# Patient Record
Sex: MALE | Race: BLACK OR AFRICAN AMERICAN | NOT HISPANIC OR LATINO | Employment: OTHER | URBAN - METROPOLITAN AREA
[De-identification: names, ages, dates, MRNs, and addresses within clinical notes are randomized per-mention and may not be internally consistent; named-entity substitution may affect disease eponyms.]

---

## 2018-05-17 ENCOUNTER — HOSPITAL ENCOUNTER (EMERGENCY)
Facility: OTHER | Age: 23
Discharge: HOME | End: 2018-05-17
Attending: EMERGENCY MEDICINE
Payer: MEDICAID

## 2018-05-17 VITALS
OXYGEN SATURATION: 98 % | HEIGHT: 71 IN | HEART RATE: 57 BPM | BODY MASS INDEX: 23.8 KG/M2 | WEIGHT: 170 LBS | TEMPERATURE: 98 F | SYSTOLIC BLOOD PRESSURE: 120 MMHG | DIASTOLIC BLOOD PRESSURE: 78 MMHG | RESPIRATION RATE: 17 BRPM

## 2018-05-17 DIAGNOSIS — Z20.2 EXPOSURE TO STD: Primary | ICD-10-CM

## 2018-05-17 PROCEDURE — 63600175 PHARM REV CODE 636 W HCPCS: Mod: JG | Performed by: EMERGENCY MEDICINE

## 2018-05-17 PROCEDURE — 96372 THER/PROPH/DIAG INJ SC/IM: CPT

## 2018-05-17 PROCEDURE — 87491 CHLMYD TRACH DNA AMP PROBE: CPT

## 2018-05-17 PROCEDURE — 86592 SYPHILIS TEST NON-TREP QUAL: CPT

## 2018-05-17 PROCEDURE — 99283 EMERGENCY DEPT VISIT LOW MDM: CPT | Mod: 25

## 2018-05-17 RX ADMIN — PENICILLIN G BENZATHINE 2.4 MILLION UNITS: 1200000 INJECTION, SUSPENSION INTRAMUSCULAR at 11:05

## 2018-05-17 NOTE — ED NOTES
Patient Identifiers for Get Owens checked and correct  LOC: The patient is awake, alert and aware of environment with an appropriate affect, the patient is oriented x 3 and speaking appropriate.  APPEARANCE: Patient resting comfortably and in no acute distress. The patient is clean and well groomed. The patient's clothing is properly fastened.  SKIN: The skin is warm and dry. The patient has normal skin turgor and moist mucus membranes. No rashes or lesions upon observation. Skin Intact , no breakdown noted.  Musculoskeletal :  Normal range of motion noted. Moves all extremities well, No swelling or tenderness noted  RESPIRATORY: Airway is open and patent, respirations are spontaneous, patient has a normal effort and rate. Breath sounds are clear & equal, bilaterally.  CARDIAC: Patient has a normal rate and rhythm, no peripheral edema noted, capillary refill < 3 seconds.   ABDOMEN: Soft and non tender to palpation, no distention observed.   PULSES: 2+ radial & pedal pulses, symmetrical in all extremities.  NEUROLOGIC: PERRL, Pupils 3mm and reacts briskly to light. Motor strength 5/5 all extremities.  The pt's facial expression is symmetrical, patient moving all extremities, normal sensation in all extremities when touched with a finger.The patient is awake, alert and cooperative with a calm affect, patient is aware of environment.      Will continue to monitor

## 2018-05-18 LAB
C TRACH DNA SPEC QL NAA+PROBE: NOT DETECTED
N GONORRHOEA DNA SPEC QL NAA+PROBE: NOT DETECTED
RPR SER QL: NORMAL

## 2018-06-20 ENCOUNTER — HOSPITAL ENCOUNTER (EMERGENCY)
Facility: OTHER | Age: 23
Discharge: HOME | End: 2018-06-21
Attending: EMERGENCY MEDICINE
Payer: MEDICAID

## 2018-06-20 VITALS
DIASTOLIC BLOOD PRESSURE: 75 MMHG | RESPIRATION RATE: 18 BRPM | BODY MASS INDEX: 21.7 KG/M2 | SYSTOLIC BLOOD PRESSURE: 128 MMHG | TEMPERATURE: 98 F | OXYGEN SATURATION: 99 % | HEART RATE: 122 BPM | HEIGHT: 71 IN | WEIGHT: 155 LBS

## 2018-06-20 DIAGNOSIS — Z87.81 HISTORY OF HAND FRACTURE: Primary | ICD-10-CM

## 2018-06-20 PROCEDURE — 99283 EMERGENCY DEPT VISIT LOW MDM: CPT
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
## 2020-07-16 ENCOUNTER — LAB VISIT (OUTPATIENT)
Dept: LAB | Facility: OTHER | Age: 25
End: 2020-07-16
Payer: OTHER GOVERNMENT

## 2020-07-16 DIAGNOSIS — Z20.822 SUSPECTED COVID-19 VIRUS INFECTION: ICD-10-CM

## 2020-07-16 DIAGNOSIS — Z03.818 ENCOUNTER FOR OBSERVATION FOR SUSPECTED EXPOSURE TO OTHER BIOLOGICAL AGENTS RULED OUT: ICD-10-CM

## 2020-07-16 PROCEDURE — U0003 INFECTIOUS AGENT DETECTION BY NUCLEIC ACID (DNA OR RNA); SEVERE ACUTE RESPIRATORY SYNDROME CORONAVIRUS 2 (SARS-COV-2) (CORONAVIRUS DISEASE [COVID-19]), AMPLIFIED PROBE TECHNIQUE, MAKING USE OF HIGH THROUGHPUT TECHNOLOGIES AS DESCRIBED BY CMS-2020-01-R: HCPCS

## 2020-07-20 LAB — SARS-COV-2 RNA RESP QL NAA+PROBE: NEGATIVE

## 2020-08-12 ENCOUNTER — LAB VISIT (OUTPATIENT)
Dept: LAB | Facility: OTHER | Age: 25
End: 2020-08-12
Payer: OTHER GOVERNMENT

## 2020-08-12 DIAGNOSIS — Z03.818 ENCOUNTER FOR OBSERVATION FOR SUSPECTED EXPOSURE TO OTHER BIOLOGICAL AGENTS RULED OUT: ICD-10-CM

## 2020-08-12 PROCEDURE — U0003 INFECTIOUS AGENT DETECTION BY NUCLEIC ACID (DNA OR RNA); SEVERE ACUTE RESPIRATORY SYNDROME CORONAVIRUS 2 (SARS-COV-2) (CORONAVIRUS DISEASE [COVID-19]), AMPLIFIED PROBE TECHNIQUE, MAKING USE OF HIGH THROUGHPUT TECHNOLOGIES AS DESCRIBED BY CMS-2020-01-R: HCPCS

## 2020-08-14 LAB — SARS-COV-2 RNA RESP QL NAA+PROBE: NOT DETECTED

## 2020-09-09 ENCOUNTER — LAB VISIT (OUTPATIENT)
Dept: PRIMARY CARE CLINIC | Facility: OTHER | Age: 25
End: 2020-09-09
Payer: OTHER GOVERNMENT

## 2020-09-09 DIAGNOSIS — Z03.818 ENCOUNTER FOR OBSERVATION FOR SUSPECTED EXPOSURE TO OTHER BIOLOGICAL AGENTS RULED OUT: ICD-10-CM

## 2020-09-09 PROCEDURE — U0003 INFECTIOUS AGENT DETECTION BY NUCLEIC ACID (DNA OR RNA); SEVERE ACUTE RESPIRATORY SYNDROME CORONAVIRUS 2 (SARS-COV-2) (CORONAVIRUS DISEASE [COVID-19]), AMPLIFIED PROBE TECHNIQUE, MAKING USE OF HIGH THROUGHPUT TECHNOLOGIES AS DESCRIBED BY CMS-2020-01-R: HCPCS

## 2020-09-10 LAB — SARS-COV-2 RNA RESP QL NAA+PROBE: NOT DETECTED

## 2020-09-29 ENCOUNTER — LAB VISIT (OUTPATIENT)
Dept: PRIMARY CARE CLINIC | Facility: OTHER | Age: 25
End: 2020-09-29
Payer: OTHER GOVERNMENT

## 2020-09-29 DIAGNOSIS — Z03.818 ENCOUNTER FOR OBSERVATION FOR SUSPECTED EXPOSURE TO OTHER BIOLOGICAL AGENTS RULED OUT: ICD-10-CM

## 2020-09-29 PROCEDURE — U0003 INFECTIOUS AGENT DETECTION BY NUCLEIC ACID (DNA OR RNA); SEVERE ACUTE RESPIRATORY SYNDROME CORONAVIRUS 2 (SARS-COV-2) (CORONAVIRUS DISEASE [COVID-19]), AMPLIFIED PROBE TECHNIQUE, MAKING USE OF HIGH THROUGHPUT TECHNOLOGIES AS DESCRIBED BY CMS-2020-01-R: HCPCS

## 2020-10-01 LAB — SARS-COV-2 RNA RESP QL NAA+PROBE: NOT DETECTED

## 2020-10-08 ENCOUNTER — LAB VISIT (OUTPATIENT)
Dept: PRIMARY CARE CLINIC | Facility: OTHER | Age: 25
End: 2020-10-08
Attending: INTERNAL MEDICINE
Payer: OTHER GOVERNMENT

## 2020-10-08 DIAGNOSIS — Z03.818 ENCOUNTER FOR OBSERVATION FOR SUSPECTED EXPOSURE TO OTHER BIOLOGICAL AGENTS RULED OUT: ICD-10-CM

## 2020-10-08 PROCEDURE — U0003 INFECTIOUS AGENT DETECTION BY NUCLEIC ACID (DNA OR RNA); SEVERE ACUTE RESPIRATORY SYNDROME CORONAVIRUS 2 (SARS-COV-2) (CORONAVIRUS DISEASE [COVID-19]), AMPLIFIED PROBE TECHNIQUE, MAKING USE OF HIGH THROUGHPUT TECHNOLOGIES AS DESCRIBED BY CMS-2020-01-R: HCPCS

## 2020-10-09 LAB — SARS-COV-2 RNA RESP QL NAA+PROBE: NOT DETECTED

## 2020-10-28 ENCOUNTER — LAB VISIT (OUTPATIENT)
Dept: PRIMARY CARE CLINIC | Facility: OTHER | Age: 25
End: 2020-10-28
Payer: OTHER GOVERNMENT

## 2020-10-28 DIAGNOSIS — Z03.818 ENCOUNTER FOR OBSERVATION FOR SUSPECTED EXPOSURE TO OTHER BIOLOGICAL AGENTS RULED OUT: ICD-10-CM

## 2020-10-28 PROCEDURE — U0003 INFECTIOUS AGENT DETECTION BY NUCLEIC ACID (DNA OR RNA); SEVERE ACUTE RESPIRATORY SYNDROME CORONAVIRUS 2 (SARS-COV-2) (CORONAVIRUS DISEASE [COVID-19]), AMPLIFIED PROBE TECHNIQUE, MAKING USE OF HIGH THROUGHPUT TECHNOLOGIES AS DESCRIBED BY CMS-2020-01-R: HCPCS

## 2020-10-29 LAB — SARS-COV-2 RNA RESP QL NAA+PROBE: NOT DETECTED

## 2020-11-24 ENCOUNTER — LAB VISIT (OUTPATIENT)
Dept: PRIMARY CARE CLINIC | Facility: OTHER | Age: 25
End: 2020-11-24
Attending: INTERNAL MEDICINE
Payer: OTHER GOVERNMENT

## 2020-11-24 DIAGNOSIS — Z03.818 ENCOUNTER FOR OBSERVATION FOR SUSPECTED EXPOSURE TO OTHER BIOLOGICAL AGENTS RULED OUT: ICD-10-CM

## 2020-11-24 PROCEDURE — U0003 INFECTIOUS AGENT DETECTION BY NUCLEIC ACID (DNA OR RNA); SEVERE ACUTE RESPIRATORY SYNDROME CORONAVIRUS 2 (SARS-COV-2) (CORONAVIRUS DISEASE [COVID-19]), AMPLIFIED PROBE TECHNIQUE, MAKING USE OF HIGH THROUGHPUT TECHNOLOGIES AS DESCRIBED BY CMS-2020-01-R: HCPCS

## 2020-11-25 LAB — SARS-COV-2 RNA RESP QL NAA+PROBE: NOT DETECTED

## 2020-12-21 ENCOUNTER — LAB VISIT (OUTPATIENT)
Dept: PRIMARY CARE CLINIC | Facility: OTHER | Age: 25
End: 2020-12-21
Payer: OTHER GOVERNMENT

## 2020-12-21 DIAGNOSIS — Z03.818 ENCOUNTER FOR OBSERVATION FOR SUSPECTED EXPOSURE TO OTHER BIOLOGICAL AGENTS RULED OUT: ICD-10-CM

## 2020-12-21 PROCEDURE — U0003 INFECTIOUS AGENT DETECTION BY NUCLEIC ACID (DNA OR RNA); SEVERE ACUTE RESPIRATORY SYNDROME CORONAVIRUS 2 (SARS-COV-2) (CORONAVIRUS DISEASE [COVID-19]), AMPLIFIED PROBE TECHNIQUE, MAKING USE OF HIGH THROUGHPUT TECHNOLOGIES AS DESCRIBED BY CMS-2020-01-R: HCPCS

## 2020-12-22 LAB — SARS-COV-2 RNA RESP QL NAA+PROBE: NOT DETECTED

## 2021-01-19 ENCOUNTER — LAB VISIT (OUTPATIENT)
Dept: PRIMARY CARE CLINIC | Facility: OTHER | Age: 26
End: 2021-01-19
Payer: OTHER GOVERNMENT

## 2021-01-19 DIAGNOSIS — Z20.822 ENCOUNTER FOR LABORATORY TESTING FOR COVID-19 VIRUS: ICD-10-CM

## 2021-01-19 PROCEDURE — U0003 INFECTIOUS AGENT DETECTION BY NUCLEIC ACID (DNA OR RNA); SEVERE ACUTE RESPIRATORY SYNDROME CORONAVIRUS 2 (SARS-COV-2) (CORONAVIRUS DISEASE [COVID-19]), AMPLIFIED PROBE TECHNIQUE, MAKING USE OF HIGH THROUGHPUT TECHNOLOGIES AS DESCRIBED BY CMS-2020-01-R: HCPCS

## 2021-01-20 LAB — SARS-COV-2 RNA RESP QL NAA+PROBE: NOT DETECTED

## 2021-02-18 ENCOUNTER — LAB VISIT (OUTPATIENT)
Dept: PRIMARY CARE CLINIC | Facility: OTHER | Age: 26
End: 2021-02-18
Payer: OTHER GOVERNMENT

## 2021-02-18 DIAGNOSIS — Z20.822 ENCOUNTER FOR LABORATORY TESTING FOR COVID-19 VIRUS: ICD-10-CM

## 2021-02-18 PROCEDURE — U0003 INFECTIOUS AGENT DETECTION BY NUCLEIC ACID (DNA OR RNA); SEVERE ACUTE RESPIRATORY SYNDROME CORONAVIRUS 2 (SARS-COV-2) (CORONAVIRUS DISEASE [COVID-19]), AMPLIFIED PROBE TECHNIQUE, MAKING USE OF HIGH THROUGHPUT TECHNOLOGIES AS DESCRIBED BY CMS-2020-01-R: HCPCS

## 2021-02-19 LAB — SARS-COV-2 RNA RESP QL NAA+PROBE: NOT DETECTED

## 2021-03-17 ENCOUNTER — LAB VISIT (OUTPATIENT)
Dept: PRIMARY CARE CLINIC | Facility: OTHER | Age: 26
End: 2021-03-17
Payer: OTHER GOVERNMENT

## 2021-03-17 DIAGNOSIS — Z20.822 ENCOUNTER FOR LABORATORY TESTING FOR COVID-19 VIRUS: ICD-10-CM

## 2021-03-17 PROCEDURE — U0003 INFECTIOUS AGENT DETECTION BY NUCLEIC ACID (DNA OR RNA); SEVERE ACUTE RESPIRATORY SYNDROME CORONAVIRUS 2 (SARS-COV-2) (CORONAVIRUS DISEASE [COVID-19]), AMPLIFIED PROBE TECHNIQUE, MAKING USE OF HIGH THROUGHPUT TECHNOLOGIES AS DESCRIBED BY CMS-2020-01-R: HCPCS | Performed by: INTERNAL MEDICINE

## 2021-03-19 LAB — SARS-COV-2 RNA RESP QL NAA+PROBE: NOT DETECTED

## 2025-02-05 NOTE — ED PROVIDER NOTES
Encounter Date: 5/17/2018    SCRIBE #1 NOTE: I, Bryan Palacios, am scribing for, and in the presence of, Dr. Mcduffie .       History     Chief Complaint   Patient presents with    Exposure to STD     Pt reports that he was exposed to syphilis, requesting evaluation.     Time seen by provider: 11:18 AM    This is a 23 y.o. Male, with history of schizophrenic disorder, who presents with complaint of possible syphilis exposure and is requesting immediate testing today. Patient states his girlfriend is pregnant, and she was diagnosed with syphilis a few weeks ago. He admits he has been sexually active with her, but denies experiencing any symptoms currently. He denies presence of abnormal lumps or bumps on his genitalia. Patient admits use of tobacco, but denies use of alcohol or illicit drugs. He has no additional complaints.         The history is provided by the patient (girlfriend at bedside).     Review of patient's allergies indicates:  No Known Allergies  Past Medical History:   Diagnosis Date    Psychiatric problem     Schizophrenic disorder      Past Surgical History:   Procedure Laterality Date    BRAIN SURGERY      KNEE SURGERY       History reviewed. No pertinent family history.  Social History   Substance Use Topics    Smoking status: Current Every Day Smoker    Smokeless tobacco: Not on file    Alcohol use No     Review of Systems   Constitutional: Negative for chills, diaphoresis and fever.   HENT: Negative for congestion, sore throat and trouble swallowing.    Eyes: Negative for photophobia and visual disturbance.   Respiratory: Negative for cough, chest tightness and shortness of breath.    Cardiovascular: Negative for chest pain.   Gastrointestinal: Negative for abdominal pain, nausea and vomiting.   Endocrine: Negative for polydipsia, polyphagia and polyuria.   Genitourinary: Negative for difficulty urinating, discharge, dysuria, penile pain, penile swelling, scrotal swelling and testicular  Physical Therapy Visit    Visit Type: Daily Treatment Note  Visit: 4  Referring Provider: Marifer Whitney, *  Medical Diagnosis (from order): M25.50 - Polyarthralgia     SUBJECTIVE                                                                                                               Patient verbally consented to allow observer present during session.    Patient reports neck symptoms have greatly improved although \"still feel it (left upper trap) when I turn - but it's moving better\".  Patient reports continued low back pain and \"haven't been able to do the exercises the last 2 weeks because of pain in my tailbone\" - states he has had recurrent pain in sacral area that \"lasts a couple weeks\" which started insidiously at least 3 years ago at which time, \"it was so bad that Harinder had to help me out of bed\".  New assessment of both ankles - patient states he broke right ankle twice and left once since fall from a swing when 11 y/o and current symptoms are pain in anterior talocrural joint bilaterally, achilles left lower extremity, and lateral malleolus right lower extremity. States symptoms in feet/ankles are also intermittent, primarily with walking \"more than 20 minutes\".     Pain / Symptoms  - Pain rating (out of 10): Current: 0 ; Best: 0; Worst: 8      OBJECTIVE                                                                                                                     Range of Motion (ROM)   (degrees unless noted; active unless noted; norms in ( ); negative=lacking to 0, positive=beyond 0)  Ankle:   - Dorsiflexion (20):       Left:  15        Right:  15     - Plantar Flexion (45-50):       Left:  45        Right:  45    - Inversion (30-35):      Left: 38      Right: 38   - Eversion (15-20):      Left: 14      Right: 13  Cervical:    - Rotation (60-80):         Left: 70° pain         Right: 70°  Comments: Mild pain in left upper trap with left rotation.         Joint Play   Ankle / Foot /  "pain.   Musculoskeletal: Negative for back pain and neck pain.   Skin: Negative for pallor.   Neurological: Negative for weakness and headaches.   Psychiatric/Behavioral: Negative for confusion.       Physical Exam     Initial Vitals [05/17/18 1037]   BP Pulse Resp Temp SpO2   111/82 70 18 98 °F (36.7 °C) 98 %      MAP       91.67         Physical Exam    Nursing note and vitals reviewed.  Constitutional: He appears well-developed and well-nourished.   HENT:   Head: Normocephalic and atraumatic.   Eyes: Conjunctivae and EOM are normal. Pupils are equal, round, and reactive to light.   Neck: Normal range of motion. Neck supple.   Cardiovascular: Normal rate and normal heart sounds.   Pulmonary/Chest: Effort normal and breath sounds normal.   Abdominal: Soft. Normal appearance and bowel sounds are normal.   Genitourinary:   Genitourinary Comments: He has an uncircumcised penis. Molluscum appearance around the head of the penis. "It's been there since 11." No discharge.    Musculoskeletal: Normal range of motion.   Neurological: He is alert and oriented to person, place, and time. He has normal strength.   Skin: Skin is warm and dry.   Psychiatric: He has a normal mood and affect. His behavior is normal. Thought content normal.         ED Course   Procedures  Labs Reviewed   C. TRACHOMATIS/N. GONORRHOEAE BY AMP DNA   RPR             Medical Decision Making:   Clinical Tests:   Lab Tests: Ordered and Reviewed  ED Management:  23-year-old male presents with exposure to STD.  His girlfriend is pregnant and in her prenatal testing was positive for syphilis.  She is currently getting treated.  He came in to be treated as well.  Has no symptoms at this time.  We will test for GC, chlamydia as well as RPR.  We will go ahead and given Bicillin shot and recommended follow up with the outpatient clinic.            Scribe Attestation:   Scribe #1: I performed the above scribed service and the documentation accurately describes " Distal Fibular Joint: Left   - Talocrural Joint: WFL  Ankle / Foot / Distal Fibular Joint: Right   - Subtalar Joint: hypomobile  - Talocrural Joint: WFL                 Treatment     Therapeutic Exercise  Reviewed previous home exercise program as described below // no significant changes to previous for neck and back symptoms   Some corrections for neck stretches to target upper trap   Limited ability with lumbar exercises but understands progression back to previous levels as tolerated.      Added calf stretch due to mild limitation bilaterally with dorsiflexion.       Performed to develop strength, improve range of motion, and reduce pain for increased independence and improved functional mobility with transfers and gait      Skilled input: verbal instruction/cues, tactile instruction/cues and as detailed above    Writer verbally educated and received verbal consent for hand placement, positioning of patient, and techniques to be performed today from patient for clothing adjustments for techniques, hand placement and palpation for techniques and therapist position for techniques as described above and how they are pertinent to the patient's plan of care.  Home Exercise Program  Access Code: [email or web address]  URL: [email or web address]  Date: 01/13/2025  Prepared by: Hamilton Washington    Exercises  - Quadruped Pelvic Tilt  - 1 x daily - 7 x weekly - 1 sets - 10 reps  - Supine Posterior Pelvic Tilt  - 2 x daily - 7 x weekly - 1 sets - 10 reps - 5 sec hold  - Supine Transversus Abdominis Bracing - Hands on Stomach  - 2 x daily - 7 x weekly - 1 sets - 10 reps - 5 sec hold  - Supine Bridge  - 2 x daily - 7 x weekly - 1 sets - 10 reps - 5 sec hold  - Marching Bridge  - 2 x daily - 7 x weekly - 1 sets - 10 reps - 5 sec hold    Access Code: [email or web address]  URL: [email or web address]  Date: 01/22/2025  Prepared by: Hamilton Washington    Exercises  - Seated Assisted Cervical Rotation with Towel  - 1  the services I performed. I attest to the accuracy of the note.    Attending Attestation:           Physician Attestation for Scribe:  Physician Attestation Statement for Scribe #1: I, Dr. Mcduffie, reviewed documentation, as scribed by Bryan Palacios  in my presence, and it is both accurate and complete.                    Clinical Impression:     1. Exposure to STD                                 Laron Mcduffie,   05/17/18 1159     x daily - 7 x weekly - 1 sets - 5 reps - 10 hold  - Seated Passive Cervical Retraction  - 3-5 x daily - 7 x weekly - 1 sets - 10 reps  - Seated Upper Trapezius Stretch (Mirrored)  - 1 x daily - 7 x weekly - 1 sets - 3 reps - 30 hold  - Seated Levator Scapulae Stretch  - 1 x daily - 7 x weekly - 1 sets - 3 reps - 30 hold      ASSESSMENT                                                                                                            Patient has multiple areas of symptoms with long histories. Insidious recurrence of ankle/foot pain bilaterally along with sacral region pain and low back pain. Multiple sites suggests systemic issue such as rheumatological factors especially given young age of patient. Will continue to treat as mechanical for now since cervical issues appear to have generally resolved with physical therapy.   Education:   - Results of above outlined education: Verbalizes understanding, Demonstrates understanding and Needs reinforcement    PLAN                                                                                                                           Suggestions for next session as indicated: Progress per plan of care        Reassess back pain and trunk ROM; review home exercise program and progress stabilization; reassess cervical ROM and segmental mobility         Therapy procedure time and total treatment time can be found documented on the Time Entry flowsheet